# Patient Record
Sex: MALE | Race: ASIAN | NOT HISPANIC OR LATINO | Employment: FULL TIME
[De-identification: names, ages, dates, MRNs, and addresses within clinical notes are randomized per-mention and may not be internally consistent; named-entity substitution may affect disease eponyms.]

---

## 2017-10-22 ENCOUNTER — LAB SERVICES (OUTPATIENT)
Dept: OTHER | Age: 57
End: 2017-10-22

## 2017-10-22 ENCOUNTER — CHARTING TRANS (OUTPATIENT)
Dept: OTHER | Age: 57
End: 2017-10-22

## 2017-10-22 LAB — RAPID STREP GROUP A: POSITIVE

## 2018-08-12 ENCOUNTER — CHARTING TRANS (OUTPATIENT)
Dept: OTHER | Age: 58
End: 2018-08-12

## 2018-10-31 VITALS
BODY MASS INDEX: 23.1 KG/M2 | HEIGHT: 74 IN | RESPIRATION RATE: 16 BRPM | OXYGEN SATURATION: 99 % | WEIGHT: 180 LBS | DIASTOLIC BLOOD PRESSURE: 88 MMHG | SYSTOLIC BLOOD PRESSURE: 136 MMHG | HEART RATE: 60 BPM | TEMPERATURE: 97.6 F

## 2018-11-02 VITALS
HEART RATE: 85 BPM | SYSTOLIC BLOOD PRESSURE: 140 MMHG | WEIGHT: 185 LBS | RESPIRATION RATE: 16 BRPM | HEIGHT: 74 IN | TEMPERATURE: 98 F | BODY MASS INDEX: 23.74 KG/M2 | DIASTOLIC BLOOD PRESSURE: 85 MMHG | OXYGEN SATURATION: 97 %

## 2021-02-22 PROCEDURE — 88305 TISSUE EXAM BY PATHOLOGIST: CPT | Performed by: INTERNAL MEDICINE

## 2021-04-15 ENCOUNTER — IMMUNIZATION (OUTPATIENT)
Dept: LAB | Facility: HOSPITAL | Age: 61
End: 2021-04-15
Attending: EMERGENCY MEDICINE
Payer: COMMERCIAL

## 2021-04-15 DIAGNOSIS — Z23 NEED FOR VACCINATION: Primary | ICD-10-CM

## 2021-04-15 PROCEDURE — 0011A SARSCOV2 VAC 100MCG/0.5ML IM: CPT

## 2021-05-13 ENCOUNTER — IMMUNIZATION (OUTPATIENT)
Dept: LAB | Facility: HOSPITAL | Age: 61
End: 2021-05-13
Attending: EMERGENCY MEDICINE
Payer: COMMERCIAL

## 2021-05-13 DIAGNOSIS — Z23 NEED FOR VACCINATION: Primary | ICD-10-CM

## 2021-05-13 PROCEDURE — 0012A SARSCOV2 VAC 100MCG/0.5ML IM: CPT

## 2021-12-16 ENCOUNTER — IMMUNIZATION (OUTPATIENT)
Dept: LAB | Facility: HOSPITAL | Age: 61
End: 2021-12-16
Attending: EMERGENCY MEDICINE
Payer: COMMERCIAL

## 2021-12-16 DIAGNOSIS — Z23 NEED FOR VACCINATION: Primary | ICD-10-CM

## 2021-12-16 PROCEDURE — 0064A SARSCOV2 VAC 50MCG/0.25ML IM: CPT

## 2022-07-06 ENCOUNTER — TELEPHONE (OUTPATIENT)
Dept: PAIN CLINIC | Facility: HOSPITAL | Age: 62
End: 2022-07-06

## 2022-07-07 ENCOUNTER — TELEPHONE (OUTPATIENT)
Dept: PAIN CLINIC | Facility: HOSPITAL | Age: 62
End: 2022-07-07

## 2022-07-07 NOTE — TELEPHONE ENCOUNTER
Pt returned my call to schedule an appointment for 7/14/22 per recommendation from Dr. Karrie Montague who then called Dr. Jose Luis Espinoza. Asked patient what he's to be seen for since there is no information in our system. Pt stated he saw [facility] and was complaining to Dr. Karrie Montague about how long it was taking to have his injection through them. Pt stated he wants an FELICITA for lower back pain which is affecting his foot. Pt has imaging reports to bring to appt. Explained to patient this is a 30 minute consultation, will review the plan, after appt we'll contact insurance for approval and then schedule the injection. Pt stated he has an injection scheduled for June 25th with another MD.  He was upset since he was expecting this appt to be the injection. He asked how long it would take for the approval, he doesn't know if he should come to this appt since he doesn't know if the injection would be done before the already schedule 6/25 appointment. Explained to patient I am not sure if insurance would cancel the current authorization to start a new prior-auth or if he doesn't need prior authorization. Pt will call insurance to ask if he needs prior authorization or not. Wanted to have appt on July 14th.

## 2022-07-14 ENCOUNTER — OFFICE VISIT (OUTPATIENT)
Dept: PAIN CLINIC | Facility: HOSPITAL | Age: 62
End: 2022-07-14
Attending: ANESTHESIOLOGY

## 2022-07-14 VITALS — SYSTOLIC BLOOD PRESSURE: 223 MMHG | HEART RATE: 61 BPM | OXYGEN SATURATION: 97 % | DIASTOLIC BLOOD PRESSURE: 127 MMHG

## 2022-07-14 DIAGNOSIS — M48.062 SPINAL STENOSIS OF LUMBAR REGION WITH NEUROGENIC CLAUDICATION: Primary | ICD-10-CM

## 2022-07-14 PROCEDURE — 99202 OFFICE O/P NEW SF 15 MIN: CPT

## 2022-07-14 PROCEDURE — 99211 OFF/OP EST MAY X REQ PHY/QHP: CPT

## 2022-07-14 NOTE — PROGRESS NOTES
PT presents ambulatory to the [facility].  PT has right LPB and R hip pain with radiculopathy. 2/10 pain with constant and acute discomfort. Pain can increase to 8/10 pain that is unbearable. Several bouts of gout over the last 3 months. Currently taking Indomethacin. Pt denies injury. Dr. Lieutenant Aguilar saw PT to establish care.  .  Refer to dictation for POC

## 2022-07-15 ENCOUNTER — TELEPHONE (OUTPATIENT)
Dept: NEUROLOGY | Facility: CLINIC | Age: 62
End: 2022-07-15

## 2022-07-15 ENCOUNTER — TELEPHONE (OUTPATIENT)
Dept: PAIN CLINIC | Facility: HOSPITAL | Age: 62
End: 2022-07-15

## 2022-07-15 NOTE — TELEPHONE ENCOUNTER
Lumbar FELICITA CPT CODE 69236 DX M54.5-DENIED     Availity online, patient does not have active coverage as of 07/09/22    [employer], spoke to Jennifer Tyson who confirmed patient does not have active coverage as of 07/09/22 also, searched by name and did not find current active coverage.     Reference # [id number]    Notified nursing

## 2022-07-15 NOTE — TELEPHONE ENCOUNTER
Left voicemail for patient to call back to discuss insurance. Per insurance, patient no longer eligible as of 7/9/22. If patient calls, we need below info:   Insurance company  Provider Line or customer service phone number from back of card  ID number  Group number

## 2022-07-15 NOTE — TELEPHONE ENCOUNTER
Patient called back and discussed insurance. He had thought his insurance was effective through 7/31/22 as it was from his previous employer. He stated that his new employer informed him that [place] was [place] 7/11/22 but patient is waiting on card. He will contact [facility] for ID number and Group number and call back with info. Informed patient that until we have insurance info, 7/14/22 office visit would default to Self Pay.

## 2022-10-28 ENCOUNTER — HOSPITAL ENCOUNTER (EMERGENCY)
Age: 62
Discharge: HOME | End: 2022-10-28
Attending: EMERGENCY MEDICINE

## 2022-10-28 VITALS
OXYGEN SATURATION: 100 % | HEIGHT: 74 IN | DIASTOLIC BLOOD PRESSURE: 99 MMHG | SYSTOLIC BLOOD PRESSURE: 189 MMHG | RESPIRATION RATE: 18 BRPM | TEMPERATURE: 97.6 F | HEART RATE: 81 BPM | BODY MASS INDEX: 23.11 KG/M2

## 2022-10-28 DIAGNOSIS — I10 ESSENTIAL HYPERTENSION, BENIGN: Primary | ICD-10-CM

## 2022-10-28 PROCEDURE — 99283 EMERGENCY DEPT VISIT LOW MDM: CPT

## 2022-10-28 RX ORDER — LATANOPROST 50 UG/ML
SOLUTION/ DROPS OPHTHALMIC
COMMUNITY
Start: 2022-10-04

## 2022-10-28 RX ORDER — ALLOPURINOL 300 MG/1
300 TABLET ORAL
COMMUNITY
Start: 2022-08-16

## 2022-10-28 RX ORDER — VALSARTAN 160 MG/1
320 TABLET ORAL
COMMUNITY
Start: 2022-07-18 | End: 2023-10-15

## 2022-10-28 RX ORDER — COLCHICINE 0.6 MG/1
0.6 TABLET ORAL
COMMUNITY
Start: 2022-08-15